# Patient Record
Sex: FEMALE | HISPANIC OR LATINO | URBAN - METROPOLITAN AREA
[De-identification: names, ages, dates, MRNs, and addresses within clinical notes are randomized per-mention and may not be internally consistent; named-entity substitution may affect disease eponyms.]

---
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
## 2025-07-07 ENCOUNTER — APPOINTMENT (OUTPATIENT)
Dept: MEDICAL GROUP | Facility: PHYSICIAN GROUP | Age: 20
End: 2025-07-07

## 2025-07-14 ENCOUNTER — OFFICE VISIT (OUTPATIENT)
Dept: MEDICAL GROUP | Age: 20
End: 2025-07-14
Payer: COMMERCIAL

## 2025-07-14 VITALS
OXYGEN SATURATION: 100 % | TEMPERATURE: 98.4 F | HEART RATE: 55 BPM | HEIGHT: 59 IN | WEIGHT: 111.6 LBS | DIASTOLIC BLOOD PRESSURE: 62 MMHG | BODY MASS INDEX: 22.5 KG/M2 | SYSTOLIC BLOOD PRESSURE: 104 MMHG

## 2025-07-14 DIAGNOSIS — Z13.29 THYROID DISORDER SCREEN: ICD-10-CM

## 2025-07-14 DIAGNOSIS — Z11.4 SCREENING FOR HIV (HUMAN IMMUNODEFICIENCY VIRUS): ICD-10-CM

## 2025-07-14 DIAGNOSIS — Z13.0 SCREENING FOR DEFICIENCY ANEMIA: ICD-10-CM

## 2025-07-14 DIAGNOSIS — Z78.9 VEGETARIAN: ICD-10-CM

## 2025-07-14 DIAGNOSIS — F84.0 AUTISM: Primary | ICD-10-CM

## 2025-07-14 DIAGNOSIS — Z13.228 SCREENING FOR METABOLIC DISORDER: ICD-10-CM

## 2025-07-14 DIAGNOSIS — R79.89 LOW VITAMIN D LEVEL: ICD-10-CM

## 2025-07-14 DIAGNOSIS — F80.9 SPEECH OR LANGUAGE DELAY: ICD-10-CM

## 2025-07-14 DIAGNOSIS — H66.002 NON-RECURRENT ACUTE SUPPURATIVE OTITIS MEDIA OF LEFT EAR WITHOUT SPONTANEOUS RUPTURE OF TYMPANIC MEMBRANE: ICD-10-CM

## 2025-07-14 DIAGNOSIS — Z11.59 NEED FOR HEPATITIS C SCREENING TEST: ICD-10-CM

## 2025-07-14 PROCEDURE — 3078F DIAST BP <80 MM HG: CPT | Performed by: STUDENT IN AN ORGANIZED HEALTH CARE EDUCATION/TRAINING PROGRAM

## 2025-07-14 PROCEDURE — 3074F SYST BP LT 130 MM HG: CPT | Performed by: STUDENT IN AN ORGANIZED HEALTH CARE EDUCATION/TRAINING PROGRAM

## 2025-07-14 PROCEDURE — 99204 OFFICE O/P NEW MOD 45 MIN: CPT | Performed by: STUDENT IN AN ORGANIZED HEALTH CARE EDUCATION/TRAINING PROGRAM

## 2025-07-14 RX ORDER — AMOXICILLIN 875 MG/1
875 TABLET, COATED ORAL 2 TIMES DAILY
Qty: 14 TABLET | Refills: 0 | Status: SHIPPED | OUTPATIENT
Start: 2025-07-14

## 2025-07-14 NOTE — PROGRESS NOTES
"Verbal consent was acquired by the patient to use [employer] ambient listening note generation during this visit     Subjective:     HPI:   History of Present Illness  The patient is a 19-year-old female who is here to establish care.    She was diagnosed with autism at the age of 5 by Dr. Oshea, a neurologist in [place]. Despite this, she is highly functional, able to speak and perform daily activities independently. She is currently attending school and plans to take a gap year after graduation. Her mother reports that she has had speech issues since birth, but she has good hearing. She is currently undergoing speech therapy for 6 hours a week.     She has been experiencing pain in her left ear since swimming on Thursday. She reports no fever.    Diet: She maintains a balanced diet that includes meat.  Alcohol: No alcohol consumption.  Tobacco: Never smoked.  Recreational Drugs: No substance use.  Sexual Practices: Not sexually active.  Living Condition: She resides with her parents and brother and is learning to cook for herself.    GYNECOLOGICAL HISTORY:  Duration: 7 days  Frequency and Flow: Heavy, requiring 3 to 4 pad changes per day    FAMILY HISTORY  Her brother was diagnosed with bipolar 1 disorder at the age of 20, about 5 years ago. There is no family history of diabetes or cancer.    No n/v/d/c, fever, chills, sob, chest pain. Lt ear ache+      Objective:     Exam:  /62   Pulse (!) 55   Temp 36.9 °C (98.4 °F)   Ht 1.49 m (4' 10.66\")   Wt 50.6 kg (111 lb 9.6 oz)   LMP 07/12/2025 (Exact Date)   SpO2 100%   BMI 22.80 kg/m²  Body mass index is 22.8 kg/m².    Physical Exam  Gen: nad  HENT: ncat, EOMI. White purulent opacification of Lt TM with mild bulging.   Resp: ctabl  Cardiac: rrr, no m  GI: nt/nd  Neuro: no focal deficits, cn 2-12 intact throughout, sensation to light touch intact throughout  Psych: abnormal speech, not fully engaged with conversation      Results      Assessment & Plan: "     1. Autism  Referral to Psychiatry      2. Screening for deficiency anemia  CBC WITHOUT DIFFERENTIAL      3. Screening for metabolic disorder  Comp Metabolic Panel      4. Thyroid disorder screen  TSH WITH REFLEX TO FT4      5. Low vitamin D level  VITAMIN D,25 HYDROXY (DEFICIENCY)      6. Vegetarian  VITAMIN B12      7. Screening for HIV (human immunodeficiency virus)  HIV AG/AB COMBO ASSAY SCREENING      8. Need for hepatitis C screening test  HEP C VIRUS ANTIBODY      9. Non-recurrent acute suppurative otitis media of left ear without spontaneous rupture of tympanic membrane  amoxicillin (AMOXIL) 875 MG tablet      10. Speech or language delay            Assessment & Plan  1. Autism.  - Referral to psychiatry has been made for further evaluation and management.  - Patient's mother advised to follow up with the psychiatrist to obtain necessary paperwork for therapy and insurance purposes.  - Discussion about patient's developmental history and previous diagnosis by Dr. Oshea, neurologist.  - Review of patient's functional status and educational background.  - continue working with speech therapist    2. Acute otitis media.  - Patient reports ear pain and hearing issues in the left ear since swimming on 07/10/2025.  - Examination revealed mild infection with white discharge behind the eardrum.  - Prescription for amoxicillin 875 mg twice daily for 7 days provided.  - Patient advised to consume yogurt daily to maintain gut health during antibiotic treatment.    3. Health maintenance.  - Patient advised to take a multivitamin with iron due to heavy menstrual periods.  - Comprehensive set of labs including CBC, CMP, thyroid function tests, vitamin B12, vitamin D, HIV, and hepatitis C screening ordered.  - Recommendation to supplement with vitamin D.  - Discussion about dietary habits and importance of a varied diet.    Follow-up  - Patient will follow up in 3 months for a physical examination.        Return in  about 3 months (around 10/14/2025) for annual visit.    Please note that this dictation was created using voice recognition software. I have made every reasonable attempt to correct obvious errors, but I expect that there are errors of grammar and possibly content that I did not discover before finalizing the note.

## 2025-07-14 NOTE — LETTER
[employer]  Greg Ayala M.D.  [address] [place] [address]  Fax: [phone]   Authorization for Release/Disclosure of   Protected Health Information   Name: AILYN LIRA : 2005 SSN: [id number]   Address: [address] [place] [address] Phone:    [phone] (home)    I authorize the entity listed below to release/disclose the PHI below to:   [employer]/Greg Ayala M.D. and Greg Ayala M.D.   Provider or Entity Name:  Holger Vincent MD// Pediatric Neurology   Address   [facility]  [address]  [place] [address] [phone]      Fax:     Reason for request: continuity of care   Information to be released:    [  ] LAST COLONOSCOPY,  including any PATH REPORT and follow-up  [  ] LAST FIT/COLOGUARD RESULT [  ] LAST DEXA  [  ] LAST MAMMOGRAM  [  ] LAST PAP  [  ] LAST LABS [  ] RETINA EXAM REPORT  [  ] IMMUNIZATION RECORDS  [  ] Release all info      [  ] Check here and initial the line next to each item to release ALL health information INCLUDING  _____ Care and treatment for drug and / or alcohol abuse  _____ HIV testing, infection status, or AIDS  _____ Genetic Testing    DATES OF SERVICE OR TIME PERIOD TO BE DISCLOSED: _____________  I understand and acknowledge that:  * This Authorization may be revoked at any time by you in writing, except if your health information has already been used or disclosed.  * Your health information that will be used or disclosed as a result of you signing this authorization could be re-disclosed by the recipient. If this occurs, your re-disclosed health information may no longer be protected by State or Federal laws.  * You may refuse to sign this Authorization. Your refusal will not affect your ability to obtain treatment.  * This Authorization becomes effective upon signing and will  on (date) __________.      If no date is indicated, this Authorization will  one (1) year from the  signature date.    Name: Jennifer Bernabe  Signature: Date:   7/14/2025     PLEASE FAX REQUESTED RECORDS BACK TO: [phone]

## 2025-07-18 NOTE — Clinical Note
REFERRAL APPROVAL NOTICE         Sent on July 18, 2025                   Marcelle Brenabe  [address]  [place] [address]                   Dear Ms. Bernabe,    After a careful review of the medical information and benefit coverage, [facility] has processed your referral. See below for additional details.    If applicable, you must be actively enrolled with your insurance for coverage of the authorized service. If you have any questions regarding your coverage, please contact your insurance directly.    REFERRAL INFORMATION   Referral #:  [id number]  Referred-To Department    Referred-By Provider:  Behavioral Health    Greg Ayala M.D.   Behavioral Health Outpatient      [address] [place] [address]  [phone] [address]  [place] [id number] [phone]    Referral Start Date:  07/14/2025  Referral End Date:   07/14/2026             SCHEDULING  If you do not already have an appointment, please call [phone] to make an appointment.     MORE INFORMATION  If you do not already have a [employer] account, sign up at: [email or web address]  You can access your medical information, make appointments, see lab results, billing information, and more.  If you have questions regarding this referral, please contact  the [facility] Referrals department at:             [phone]. Monday - Friday 8:00AM - 5:00PM.     Sincerely,    [facility]